# Patient Record
Sex: MALE | Race: WHITE | Employment: FULL TIME | URBAN - METROPOLITAN AREA
[De-identification: names, ages, dates, MRNs, and addresses within clinical notes are randomized per-mention and may not be internally consistent; named-entity substitution may affect disease eponyms.]

---

## 2017-09-11 ENCOUNTER — HOSPITAL ENCOUNTER (OUTPATIENT)
Dept: GENERAL RADIOLOGY | Facility: HOSPITAL | Age: 62
Discharge: HOME | End: 2017-09-11
Attending: GENERAL PRACTICE
Payer: COMMERCIAL

## 2017-09-11 DIAGNOSIS — R05.9 COUGH: ICD-10-CM

## 2017-09-11 PROCEDURE — 71020 XR CHEST PA + LAT CHEST (CPT=71020): CPT | Performed by: GENERAL PRACTICE

## 2017-09-14 ENCOUNTER — HOSPITAL ENCOUNTER (OUTPATIENT)
Dept: MRI IMAGING | Facility: HOSPITAL | Age: 62
Discharge: HOME | End: 2017-09-14
Attending: GENERAL PRACTICE
Payer: COMMERCIAL

## 2017-09-14 DIAGNOSIS — R20.0 RIGHT ARM NUMBNESS: ICD-10-CM

## 2017-09-14 DIAGNOSIS — M54.2 PAIN, NECK: ICD-10-CM

## 2017-09-14 PROCEDURE — 72141 MRI NECK SPINE W/O DYE: CPT | Performed by: GENERAL PRACTICE

## 2018-01-18 ENCOUNTER — TELEPHONE (OUTPATIENT)
Dept: PAIN CLINIC | Facility: HOSPITAL | Age: 63
End: 2018-01-18

## 2018-01-18 NOTE — TELEPHONE ENCOUNTER
Spoke to patient and tried to schedule his appointment. Patient wanted to be seen next week because he stated \" he was out of meds\". He said he will call me back.

## 2020-07-26 ENCOUNTER — HOSPITAL ENCOUNTER (OUTPATIENT)
Age: 65
Discharge: HOME | End: 2020-07-26
Attending: EMERGENCY MEDICINE
Payer: COMMERCIAL

## 2020-07-26 VITALS
HEART RATE: 81 BPM | TEMPERATURE: 98 F | RESPIRATION RATE: 20 BRPM | SYSTOLIC BLOOD PRESSURE: 127 MMHG | OXYGEN SATURATION: 99 % | DIASTOLIC BLOOD PRESSURE: 91 MMHG

## 2020-07-26 DIAGNOSIS — R33.9 URINARY RETENTION: Primary | ICD-10-CM

## 2020-07-26 LAB
#MXD IC: 0.9 X10ˆ3/UL (ref 0.1–1)
BILIRUB UR QL STRIP: NEGATIVE
CREAT BLD-MCNC: 0.7 MG/DL (ref 0.7–1.3)
GLUCOSE BLD-MCNC: 105 MG/DL (ref 70–99)
GLUCOSE UR STRIP-MCNC: NEGATIVE MG/DL
HCT VFR BLD AUTO: 42.8 % (ref 39–53)
HGB BLD-MCNC: 15.1 G/DL (ref 13–17.5)
ISTAT BUN: 15 MG/DL (ref 8–20)
ISTAT CHLORIDE: 103 MMOL/L (ref 101–111)
ISTAT HEMATOCRIT: 50 % (ref 37–53)
ISTAT IONIZED CALCIUM FOR CHEM 8: 1.11 MMOL/L (ref 1.12–1.32)
ISTAT POTASSIUM: 3.9 MMOL/L (ref 3.6–5.1)
ISTAT SODIUM: 137 MMOL/L (ref 136–145)
ISTAT TCO2: 22 MMOL/L (ref 22–32)
KETONES UR STRIP-MCNC: NEGATIVE MG/DL
LEUKOCYTE ESTERASE UR QL STRIP: NEGATIVE
LYMPHOCYTES # BLD AUTO: 1.2 X10ˆ3/UL (ref 1–4)
LYMPHOCYTES NFR BLD AUTO: 11.6 %
MCH RBC QN AUTO: 32.2 PG (ref 26–34)
MCHC RBC AUTO-ENTMCNC: 35.3 G/DL (ref 31–37)
MCV RBC AUTO: 91.3 FL (ref 80–100)
MIXED CELL %: 8.1 %
NEUTROPHILS # BLD AUTO: 8.4 X10ˆ3/UL (ref 1.5–7.7)
NEUTROPHILS NFR BLD AUTO: 80.3 %
NITRITE UR QL STRIP: NEGATIVE
PH UR STRIP: 5.5 [PH]
PLATELET # BLD AUTO: 276 X10ˆ3/UL (ref 150–450)
PROT UR STRIP-MCNC: NEGATIVE MG/DL
RBC # BLD AUTO: 4.69 X10ˆ6/UL (ref 4.3–5.7)
SP GR UR STRIP: 1.02
UROBILINOGEN UR STRIP-ACNC: <2 MG/DL
WBC # BLD AUTO: 10.5 X10ˆ3/UL (ref 4–11)

## 2020-07-26 PROCEDURE — 96360 HYDRATION IV INFUSION INIT: CPT

## 2020-07-26 PROCEDURE — 80047 BASIC METABLC PNL IONIZED CA: CPT

## 2020-07-26 PROCEDURE — 87086 URINE CULTURE/COLONY COUNT: CPT | Performed by: EMERGENCY MEDICINE

## 2020-07-26 PROCEDURE — 99214 OFFICE O/P EST MOD 30 MIN: CPT

## 2020-07-26 PROCEDURE — 81002 URINALYSIS NONAUTO W/O SCOPE: CPT

## 2020-07-26 PROCEDURE — 99204 OFFICE O/P NEW MOD 45 MIN: CPT

## 2020-07-26 PROCEDURE — 85025 COMPLETE CBC W/AUTO DIFF WBC: CPT | Performed by: EMERGENCY MEDICINE

## 2020-07-26 PROCEDURE — 96361 HYDRATE IV INFUSION ADD-ON: CPT

## 2020-07-26 RX ORDER — TAMSULOSIN HYDROCHLORIDE 0.4 MG/1
0.4 CAPSULE ORAL DAILY
Qty: 14 CAPSULE | Refills: 0 | Status: SHIPPED | OUTPATIENT
Start: 2020-07-26 | End: 2020-07-26

## 2020-07-26 RX ORDER — SODIUM CHLORIDE 9 MG/ML
1000 INJECTION, SOLUTION INTRAVENOUS ONCE
Status: COMPLETED | OUTPATIENT
Start: 2020-07-26 | End: 2020-07-26

## 2020-07-26 RX ORDER — TAMSULOSIN HYDROCHLORIDE 0.4 MG/1
0.4 CAPSULE ORAL DAILY
Qty: 14 CAPSULE | Refills: 0 | Status: SHIPPED | OUTPATIENT
Start: 2020-07-26 | End: 2020-08-09

## 2020-07-26 NOTE — ED PROVIDER NOTES
Patient Seen [address]: [address]      History   Patient presents with:  Urinary Symptoms    Stated Complaint: unable to urinate    HPI    64 yo male with difficulty urinating.  He has not emptied his bladder since yesterday aft There is distension (pelvis). Genitourinary:     Penis: Normal.       Scrotum/Testes: Normal.   Musculoskeletal: Normal range of motion. General: No swelling or deformity. Skin:     General: Skin is warm and dry.       Capillary Refill: Capillar

## 2020-07-26 NOTE — ED NOTES
Morales draining clear, yellow urine. C/o \"spasms\" to lower abdomen lasting a few seconds at a time but painful and frequent.

## 2020-07-26 NOTE — ED INITIAL ASSESSMENT (HPI)
Difficulty urinating since yesterday. Frequently voiding small amounts with fullness to bladder. Patient is diaphoretic and feeling \"hot\". States he does not have A/C in his home. Denies dysuria or hematuria.

## 2020-07-28 ENCOUNTER — TELEPHONE (OUTPATIENT)
Dept: SURGERY | Facility: CLINIC | Age: 65
End: 2020-07-28

## 2020-07-28 ENCOUNTER — OFFICE VISIT (OUTPATIENT)
Dept: SURGERY | Facility: CLINIC | Age: 65
End: 2020-07-28
Payer: COMMERCIAL

## 2020-07-28 VITALS — SYSTOLIC BLOOD PRESSURE: 110 MMHG | DIASTOLIC BLOOD PRESSURE: 76 MMHG | HEART RATE: 103 BPM

## 2020-07-28 DIAGNOSIS — R33.9 URINARY RETENTION: Primary | ICD-10-CM

## 2020-07-28 PROCEDURE — 3078F DIAST BP <80 MM HG: CPT | Performed by: UROLOGY

## 2020-07-28 PROCEDURE — 3074F SYST BP LT 130 MM HG: CPT | Performed by: UROLOGY

## 2020-07-28 PROCEDURE — 99204 OFFICE O/P NEW MOD 45 MIN: CPT | Performed by: UROLOGY

## 2020-07-28 RX ORDER — DUTASTERIDE 0.5 MG/1
0.5 CAPSULE, LIQUID FILLED ORAL DAILY
Qty: 90 CAPSULE | Refills: 3 | Status: SHIPPED | OUTPATIENT
Start: 2020-07-28

## 2020-07-28 RX ORDER — DUTASTERIDE 0.5 MG/1
0.5 CAPSULE, LIQUID FILLED ORAL DAILY
Qty: 90 CAPSULE | Refills: 3 | Status: SHIPPED | OUTPATIENT
Start: 2020-07-28 | End: 2020-07-28

## 2020-07-28 NOTE — PROGRESS NOTES
SUBJECTIVE:  Ortiz Kate is a 63year old male who presents for a consultation at the request of, and a copy of this note will be sent to, N/A, for evaluation of  benign prostatic hyperplasia and urinary retention. He states that the problem is unchanged. None.  All other review of systems reviewed and otherwise negative    OBJECTIVE:  /76 (BP Location: Left arm, Patient Position: Sitting, Cuff Size: adult)   Pulse 103   He appears well, in no apparent distress.   Alert and oriented times three, pleasa Prescriptions      No prescriptions requested or ordered in this encounter       Imaging & Referrals:  None

## 2020-07-28 NOTE — TELEPHONE ENCOUNTER
Patient seen today in office. Patient's dutasteride was refilled and sent to wrong pharmacy. Patient is waiting.     Patient meets Protocol Criteria; medication reordered and sent to preferred pharmacy, [facility].     Benign Prostatic Hypertrophy Medications

## 2020-07-30 ENCOUNTER — TELEPHONE (OUTPATIENT)
Dept: SURGERY | Facility: CLINIC | Age: 65
End: 2020-07-30

## 2020-07-30 NOTE — TELEPHONE ENCOUNTER
Pt called stating pt is scheduled on 8-3-20 to have catheter removed. Pt is having spasm since 7-25-20. Pt is looking for information about a full bladder to having spasm. With the catheter in spasm every 1 to 2 hours.   pt has questions about if pt flower

## 2020-07-30 NOTE — TELEPHONE ENCOUNTER
Phoned pt back and spoke with him. He states he has a nurse appt for Monday, but his bladder spasms have improved with the catheter. states he is still getting bladder spasms every 1-2 hrs.  He states the catheter was placed because he was getting them ever

## 2020-08-10 ENCOUNTER — NURSE ONLY (OUTPATIENT)
Dept: SURGERY | Facility: CLINIC | Age: 65
End: 2020-08-10
Payer: COMMERCIAL

## 2020-08-10 DIAGNOSIS — Z46.6 ENCOUNTER FOR FOLEY CATHETER REMOVAL: Primary | ICD-10-CM

## 2020-08-10 NOTE — PROGRESS NOTES
Patient's name and  verified. Patient presents for melchor catheter removal. See 2020 last office visit for physician's order. Patient's urine in melchor bag is yellow. Patient's melchor catheter balloon deflated, melchor gently removed.   Patient tole

## 2021-02-08 ENCOUNTER — OFFICE VISIT (OUTPATIENT)
Dept: INTERNAL MEDICINE CLINIC | Facility: CLINIC | Age: 66
End: 2021-02-08
Payer: COMMERCIAL

## 2021-02-08 VITALS
HEART RATE: 92 BPM | SYSTOLIC BLOOD PRESSURE: 118 MMHG | HEIGHT: 70 IN | RESPIRATION RATE: 20 BRPM | BODY MASS INDEX: 21.07 KG/M2 | WEIGHT: 147.19 LBS | DIASTOLIC BLOOD PRESSURE: 78 MMHG

## 2021-02-08 DIAGNOSIS — Z76.89 ENCOUNTER TO ESTABLISH CARE WITH NEW DOCTOR: Primary | ICD-10-CM

## 2021-02-08 PROBLEM — R97.20 ABNORMAL PSA: Status: ACTIVE | Noted: 2017-09-01

## 2021-02-08 PROBLEM — R33.8 ACUTE URINARY RETENTION: Status: ACTIVE | Noted: 2020-07-01

## 2021-02-08 PROCEDURE — 3008F BODY MASS INDEX DOCD: CPT | Performed by: INTERNAL MEDICINE

## 2021-02-08 PROCEDURE — 99202 OFFICE O/P NEW SF 15 MIN: CPT | Performed by: INTERNAL MEDICINE

## 2021-02-08 PROCEDURE — 3078F DIAST BP <80 MM HG: CPT | Performed by: INTERNAL MEDICINE

## 2021-02-08 PROCEDURE — 3074F SYST BP LT 130 MM HG: CPT | Performed by: INTERNAL MEDICINE

## 2021-02-08 NOTE — PROGRESS NOTES
Keesha Bsoton is a 63year old male who presents this afternoon for his initial visit to establish ongoing primary care. HPI:   He has been seeing Dr. Liana Klein for ongoing treatment of cervical and lumbar spinal stenosis and saw Dr. Liana Klein earlier today. Tobacco Use      Smoking status: Current Every Day Smoker        Packs/day: 1.50        Years: 45.00        Pack years: 67.5        Types: Cigarettes      Smokeless tobacco: Never Used    Alcohol use: Yes      Frequency: Monthly or less      Drinks per [facility]

## 2021-02-08 NOTE — PATIENT INSTRUCTIONS
Please schedule a follow-up visit with Dr. Nick Fernandez. Continue to follow-up with Dr. Wesley Nelson. Please schedule a physical with me soon.

## 2021-02-25 ENCOUNTER — LAB ENCOUNTER (OUTPATIENT)
Dept: LAB | Facility: HOSPITAL | Age: 66
End: 2021-02-25
Attending: Other
Payer: COMMERCIAL

## 2021-02-25 ENCOUNTER — TELEPHONE (OUTPATIENT)
Dept: NEUROLOGY | Facility: CLINIC | Age: 66
End: 2021-02-25

## 2021-02-25 ENCOUNTER — OFFICE VISIT (OUTPATIENT)
Dept: NEUROLOGY | Facility: CLINIC | Age: 66
End: 2021-02-25
Payer: COMMERCIAL

## 2021-02-25 VITALS
HEIGHT: 72 IN | WEIGHT: 145 LBS | DIASTOLIC BLOOD PRESSURE: 70 MMHG | BODY MASS INDEX: 19.64 KG/M2 | SYSTOLIC BLOOD PRESSURE: 122 MMHG

## 2021-02-25 DIAGNOSIS — H47.012 NAION (NON-ARTERITIC ANTERIOR ISCHEMIC OPTIC NEUROPATHY), LEFT: ICD-10-CM

## 2021-02-25 DIAGNOSIS — G45.3 AMAUROSIS FUGAX OF LEFT EYE: ICD-10-CM

## 2021-02-25 DIAGNOSIS — G95.9 CERVICAL MYELOPATHY (HCC): ICD-10-CM

## 2021-02-25 DIAGNOSIS — G45.3 AMAUROSIS FUGAX OF LEFT EYE: Primary | ICD-10-CM

## 2021-02-25 LAB
CHOLEST SMN-MCNC: 168 MG/DL (ref ?–200)
CK SERPL-CCNC: 65 U/L
EST. AVERAGE GLUCOSE BLD GHB EST-MCNC: 97 MG/DL (ref 68–126)
FOLATE SERPL-MCNC: 14.8 NG/ML (ref 8.7–?)
HBA1C MFR BLD HPLC: 5 % (ref ?–5.7)
HDLC SERPL-MCNC: 86 MG/DL (ref 40–59)
LDLC SERPL CALC-MCNC: 67 MG/DL (ref ?–100)
NONHDLC SERPL-MCNC: 82 MG/DL (ref ?–130)
PATIENT FASTING Y/N/NP: YES
TRIGL SERPL-MCNC: 76 MG/DL (ref 30–149)
VIT B12 SERPL-MCNC: 566 PG/ML (ref 193–986)
VLDLC SERPL CALC-MCNC: 15 MG/DL (ref 0–30)

## 2021-02-25 PROCEDURE — 83825 ASSAY OF MERCURY: CPT

## 2021-02-25 PROCEDURE — 84425 ASSAY OF VITAMIN B-1: CPT | Performed by: OTHER

## 2021-02-25 PROCEDURE — 82550 ASSAY OF CK (CPK): CPT

## 2021-02-25 PROCEDURE — 86255 FLUORESCENT ANTIBODY SCREEN: CPT

## 2021-02-25 PROCEDURE — 82300 ASSAY OF CADMIUM: CPT

## 2021-02-25 PROCEDURE — 3074F SYST BP LT 130 MM HG: CPT | Performed by: OTHER

## 2021-02-25 PROCEDURE — 3008F BODY MASS INDEX DOCD: CPT | Performed by: OTHER

## 2021-02-25 PROCEDURE — 3078F DIAST BP <80 MM HG: CPT | Performed by: OTHER

## 2021-02-25 PROCEDURE — 84446 ASSAY OF VITAMIN E: CPT | Performed by: OTHER

## 2021-02-25 PROCEDURE — 83655 ASSAY OF LEAD: CPT

## 2021-02-25 PROCEDURE — 82607 VITAMIN B-12: CPT | Performed by: OTHER

## 2021-02-25 PROCEDURE — 82746 ASSAY OF FOLIC ACID SERUM: CPT | Performed by: OTHER

## 2021-02-25 PROCEDURE — 36415 COLL VENOUS BLD VENIPUNCTURE: CPT

## 2021-02-25 PROCEDURE — 83036 HEMOGLOBIN GLYCOSYLATED A1C: CPT

## 2021-02-25 PROCEDURE — 86334 IMMUNOFIX E-PHORESIS SERUM: CPT

## 2021-02-25 PROCEDURE — 83876 ASSAY MYELOPEROXIDASE: CPT

## 2021-02-25 PROCEDURE — 82175 ASSAY OF ARSENIC: CPT

## 2021-02-25 PROCEDURE — 80061 LIPID PANEL: CPT | Performed by: OTHER

## 2021-02-25 PROCEDURE — 86038 ANTINUCLEAR ANTIBODIES: CPT

## 2021-02-25 PROCEDURE — 99205 OFFICE O/P NEW HI 60 MIN: CPT | Performed by: OTHER

## 2021-02-25 PROCEDURE — 83516 IMMUNOASSAY NONANTIBODY: CPT

## 2021-02-25 RX ORDER — ASPIRIN 81 MG/1
81 TABLET, CHEWABLE ORAL DAILY
Qty: 90 TABLET | Refills: 3 | Status: SHIPPED | OUTPATIENT
Start: 2021-02-25 | End: 2022-02-25

## 2021-02-25 RX ORDER — ASPIRIN 81 MG/1
324 TABLET, CHEWABLE ORAL ONCE
Status: SHIPPED | OUTPATIENT
Start: 2021-02-25

## 2021-02-25 NOTE — TELEPHONE ENCOUNTER
CARD ECHO 2D DOPPLER (CPT=93306)- APPROVED   . Availity online for authorization of approval for above. Authorization is not required.    Transaction ID: [id number] Transaction Date: Feb 25 11:07 am Customer ID: [id number]    Patient notified via My Chart

## 2021-02-25 NOTE — PROGRESS NOTES
Spoke to Sherrie, scheduled for 10 am tomorrow for CTA head and neck. Patient will determine where he will have MRIs and schedule himself. Will see [facility] opthamologist. Progress note and CTA orders available to sherrie via my chart.

## 2021-02-25 NOTE — PROGRESS NOTES
[address]  [address] [place] [address]  [phone]          [address]  NEW PATIENT EVALUATION  Reason for Admission/Consultation: Worsening numbness in his arms for the last 4 years  Requ see in the lower right hand quadrant. Began clearing up. Lasted 2 minutes. Occasionally snores. Has tremors in his legs when he presses on the pedals while riding his bicycle. Some word finding difficulties in [place].   The patient is a member of th to get the imaging today. Because his symptoms occurred 3 months ago, and he has not had recurrent symptoms concerning for TIAs, and because he is concerned about cost, he will have the imaging done tomorrow at [facility].   He is well educated about Speech is spontaneous, fluent, and prosodic. Comprehension and repetition intact. Phrase length and rate are normal. No paraphasic errors, neologisms, anomia, acalculia, apraxia, anosognosia, or R/L confusion.   - Cranial Nerves: No gaze preference.  Visual Cerebellum: No truncal ataxia. No titubations. No dysmetria, no dysdiadochokinesis. No overshoot.   - Gait/station: Normal gait and station.  Symmetric arm swing.  - Plantar response: flexor bilaterally    Data reviewed    Test results/Imaging:   No results stroke/TIA involving his right hemisphere. Discussed with the patient about sending him to the ER versus having him have the work-up done as an outpatient. He reports that his insurance will end soon. He has no income currently.   He would like to mini if symptoms worsen. All questions were answered. All side effects of drugs were discussed.      Time spent for examination, counseling and coordination of care such as potential treatment options- 60 minutes with more than 50% of the time spent counseling

## 2021-02-28 LAB
ALPHA-TOCOPHEROL (VIT E) -MG/L: 4.4 MG/L
GAMMA-TOCOPHEROL (VIT E) -MG/L: 0.7 MG/L
MYELOPEROX ANTIBODIES, IGG: 2 AU/ML
SERINE PROTEASE3, IGG: 6 AU/ML

## 2021-03-01 LAB
NUCLEAR IGG TITR SER IF: NEGATIVE {TITER}
VITAMIN B1 (THIAMINE), WHOLE B: 142 NMOL/L

## 2021-03-02 LAB
ARSENIC, BLOOD: <10 UG/L
CADMIUM, BLOOD: <1 UG/L
LEAD, BLOOD (VENOUS): 2.8 UG/DL
MERCURY, BLOOD: <2.5 UG/L

## 2021-03-10 ENCOUNTER — TELEPHONE (OUTPATIENT)
Dept: NEUROLOGY | Facility: CLINIC | Age: 66
End: 2021-03-10

## 2021-03-10 NOTE — TELEPHONE ENCOUNTER
Called pt re: his MRI results. Was called earlier by Dr. Sharon Thomason, Dr. Sharon Thomason notes that his MRI brain appears stable compared to the prior scan from 2005 however there is a new area ifrontal lobe that demonstrates signal change in the bone marrow.   He recommended

## 2021-09-22 ENCOUNTER — TELEPHONE (OUTPATIENT)
Dept: NEUROLOGY | Facility: CLINIC | Age: 66
End: 2021-09-22

## 2021-09-22 NOTE — TELEPHONE ENCOUNTER
----- Message from Tyrone Juarez DO sent at 9/22/2021 11:03 AM CDT -----  Regarding: refer to nsgy and for bone scan  Please tell the patient that he needs to see neurosurgery for his cervical stenosis.   Please call my order referral but he needs to call s

## 2021-09-23 NOTE — TELEPHONE ENCOUNTER
Per chart review patient has been seen by Dr Marina Frances. Progress note from Dr Marina Frances patient has been seen by Dr Ramírez Hargrove for spinal stenosis. Left message patient voice mail to return call to office for test results.